# Patient Record
(demographics unavailable — no encounter records)

---

## 2024-12-14 NOTE — ASSESSMENT
[FreeTextEntry1] : 54 Y/O male with pmhx of htn, obesity, elevated psa presents for f/u.  # PSA elevation - lowered to 6.65 - Urology performed MRI, in 2023, interemdiate grade - needs f/u check PSA, refer back to Urology  # HTN -continues to remain elevated -c/w with losartan and added hydrochlorothiazide, discontinued amlodipine  # BMI - remains elevated at 42.52 kg/m2 - lifestyle modifications discussed  # Pre-DM -5.7% - f/u in next vis - metform  # Knee pain - start diclofenac PO  # HCM - colonoscopy refused - doing Stool testing last neg 2023 - routine labs ordered -f/u in 3 months.

## 2025-01-15 NOTE — HISTORY OF PRESENT ILLNESS
[FreeTextEntry1] : Pt is a 55y/o M here for follow up of lab results. PSA -- 6.65ng/mL UA/Cx - neg RBUS - no hydro, prostatomegaly with limited eval, rec prostate MRI  No new complaints today.  Labs  PSA 6.91 3/2022  PSA 6.65 6/2023 PSA 11.1 12/2024  MRI Prostate 8/2023 - 75cc prostate, PIRADS 3 - intermediate  Office Visit 01/15/2025  Pt reports no new complaints. He never followed

## 2025-01-15 NOTE — ASSESSMENT
[FreeTextEntry1] : #Elevated PSA - Prostate MRI 8/2023 shows a 75cc prostate with a PIRADS 3 lesion - MRI prostate 3D segmentation - PSA 11.1 (12/2024) - repeat PSA and MRI since it is over 1 year old - Plan for MRI guided transperineal Prostate Biopsy with Dr. Adrian Today I discussed the risks and benefits of PSA screening.T here are a number of benign conditions including UTI, BPH, mckinney catheter, certain activities and prostatitis that will increase PSA in the absence of cancer. Unfortunately, the only way to definitively diagnose cancer is with a prostate biopsy. I discussed the method of performing biopsy (transperineal with anesthesia) and the risks (bleeding, infection, urinary retention, ED). In addition to this, the patient and I discussed the discrepancy between the prevalence of prostate cancer and the prevalence of death from prostate cancer.  Prostate cancer is the most common solid tumor in American men. However, I mentioned how it can be very slow growing, many men with prostate cancer will die with the disease rather than from it.

## 2025-02-26 NOTE — HISTORY OF PRESENT ILLNESS
[FreeTextEntry1] : Prior pt of Dr. Nettles. Pt is a 53y/o M here for follow up of lab results. PSA -- 6.65ng/mL UA/Cx - neg RBUS - no hydro, prostatomegaly with limited eval, rec prostate MRI  Labs PSA 6.91 3/2022 PSA 6.65 6/2023 PSA 11.1 12/2024 PSA 9.7 1/2025  MRI Prostate 8/2023 - 75cc prostate, PIRADS 3 - intermediate  Office Visit 01/15/2025 Pt reports no new complaints. He never followed  Office Visit 02/26/2025  Pt reports no new complaints. We spent time today reviewing his labs and imaging.  Prostate MRI 2/2025 - PIRADS 4 in addition to known PIRADS 3 lesion. Prostate volume 62

## 2025-02-26 NOTE — PLAN

## 2025-02-26 NOTE — ASSESSMENT
[FreeTextEntry1] : #Elevated PSA - Prostate MRI 8/2023 shows a 75cc prostate with a PIRADS 3 lesion - Prostate MRI 2/2025 - PIRADS 4 in addition to known PIRADS 3 lesion. Prostate volume 62 - MRI prostate 3D segmentation - PSA 11.1 (12/2024) - PSA 9.73 2/2025 - Plan for MRI guided transperineal Prostate Biopsy with Dr. Adrian/Randal Today I discussed the risks and benefits of PSA screening.T here are a number of benign conditions including UTI, BPH, mckinney catheter, certain activities and prostatitis that will increase PSA in the absence of cancer. Unfortunately, the only way to definitively diagnose cancer is with a prostate biopsy. I discussed the method of performing biopsy (transperineal with anesthesia) and the risks (bleeding, infection, urinary retention, ED). In addition to this, the patient and I discussed the discrepancy between the prevalence of prostate cancer and the prevalence of death from prostate cancer. Prostate cancer is the most common solid tumor in American men. However, I mentioned how it can be very slow growing, many men with prostate cancer will die with the disease rather than from it.

## 2025-04-09 NOTE — ASSESSMENT
[FreeTextEntry1] : #Prostate Cancer - Favorable intermediate risk. GG2 in 1 core, 55% - Prostate MRI 8/2023 shows a 75cc prostate with a PIRADS 3 lesion initially evaluated with Dr. Nettles.  - Prostate MRI 2/2025 - PIRADS 4 in addition to known PIRADS 3 lesion. Prostate volume 62 - PSA 11.1 (12/2024) - PSA 9.73 2/2025 - PSMA to r/o mets - Will refer patient to our [facility] Urologic specialist for consultation regarding his treatment options including RALP vs RT/ADT

## 2025-04-09 NOTE — HISTORY OF PRESENT ILLNESS
[FreeTextEntry1] : Prior pt of Dr. Nettles. Pt is a 55y/o M hx of obesity, HTN, preDM here for follow up of lab results. PSA - 6.65ng/mL UA/Cx - neg RBUS - no hydro, prostatomegaly with limited eval, rec prostate MRI  Labs PSA 6.91 3/2022 PSA 6.65 6/2023 PSA 11.1 12/2024 PSA 9.7 1/2025  MRI Prostate 8/2023 - 75cc prostate, PIRADS 3 - intermediate  Office Visit 01/15/2025 Pt reports no new complaints. He never followed up previously.   Office Visit 02/26/2025 We spent time today reviewing his labs and imaging. Prostate MRI 2/2025 - PIRADS 4 in addition to known PIRADS 3 lesion. Prostate volume 62  Office Visit 04/09/2025  Pt reports no new complaints. Voiding well. Still having some mild hematospermia.  Prostate path: Isiah score 3+4=7 in 1 core involving 55% of specimen

## 2025-04-09 NOTE — PLAN

## 2025-05-15 NOTE — ASSESSMENT
[FreeTextEntry1] : DANGELO GARCÍA is a 56-year-old male with hx of elevated PSA, MR Prostate shows PI-RADS 4 and 3 lesion, sp TP fusion targeted prostate biopsy (03/31/2025) pathology shows 1/14 cores positive Isiah 3+4 PCa GG2, 55% of core positive with pattern 4 comprising 5% of core; both targets negative for prostate cancer   We had a long discussion today regarding options and my recommendation was for patient to undergo definitive treatment.  Patient was reluctant to undergo surgery even though I explained to him that there may be challenges for patients who undergo radiation and require salvage treatment.  He declined robotic radical prostatectomy and wishes to meet with radiation oncology.   Of note If he were to have radical prostatectomy, my recommendation was for patient to consider single port where the surgery can be done with minimal to no Trendelenburg in the supine position. With a BMI of 44, he will be a poor candidate for multiport transperitoneal robotic radical prostatectomy which is what we could offer the patient here in [place].  Pt elects to undergo radiation therapy. Will further discuss risk and benefits of treatment with radiation oncology.  He was previously seen in urology clinic and will continue f/u there

## 2025-05-15 NOTE — ADDENDUM
[FreeTextEntry1] : Patient's note was transcribed with the assistance of a medical scribe under the supervision of Dr. Pham. I, Dr. Pham, have reviewed the patient's chart and agree that it aligns with my medical decisions. Haile Celaya, our scribe, also served as a chaperone for physical examination purposes.  The submitted E/M billing level for this visit reflects the total time spent on the day of the visit including face-to-face time spent with the patient, non-face-to-face review of medical records and relevant information, documentation, and asynchronous communication with the patient after a visit via phone, email, or patients EHR portal after the visit.  The medical records reviewed are either scanned into the chart or reviewed with the patient using a patients electronic medical records portal for patients with records not available to [facility] via electronic transmission platforms from other institutions and labs.  Time spend counseling and performing coordination of care was also included in determining the appropriate EM billing level.

## 2025-05-15 NOTE — HISTORY OF PRESENT ILLNESS
[FreeTextEntry1] : DANGELO GARCÍA is a 56-year-old male with hx of elevated PSA, MR Prostate shows PI-RADS 4 and 3 lesion, sp TP fusion targeted prostate biopsy (03/31/2025) pathology shows 1/14 cores positive Isiah 3+4 PCa GG2, 55% of core positive with pattern 4 comprising 5% of core; both targets negative for prostate cancer   Denies flank pain, gross hematuria, dysuria or associated symptoms. Denies LUTS.  MR Prostate w/wo IV Cont 02/17/2025 - images independently reviewed by me agree w findings IMPRESSION: PIRADS 4 - High (clinically significant cancer is likely to be present). Left, posterior medial (PZpm), midgland, peripheral zone lesion. Redemonstration of PIRADS 3 lesion - Intermediate (the presence of clinically significant cancer is equivocal). Left, posterior (TZp), apex, transition zone. Prostate Volume: 62 cc  PSMA PET CT 04/29/2025 - IMPRESSION: Two foci of PSMA uptake within the prostate gland (up to 2+). Nonspecific pelvic and inguinal subcentimeter lymph nodes with 1+ PSMA uptake. No other definite sites of abnormal PSMA uptake to suggest metastatic disease  PSA Trend 03/2022 - 6.91  06/2023 - 6.65  12/2024 - 11.1  01/2025 - 9.7    history: Denies previous kidney stones, recurrent UTIs. No instrumentation. Family History: denies  malignancies. Social History: from [facility], , two kids

## 2025-06-04 NOTE — ASSESSMENT
[FreeTextEntry1] : 56 Y/O male with pmhx of htn, obesity, elevated psa presents for f/u. Poor follow up  # PSA elevation - found to have prostate cancer, [place] score 3+4 - Favorable intermediate risk. GG2 in 1 core, 55%  Labs PSA 6.91 3/2022 PSA 6.65 6/2023 PSA 11.1 12/2024 PSA  9.73 1/16/25  - Urology note reviewed - awaiting for radiation oncology.   # HTN (142/93) -continues to remain elevated -c/w with losartan and added hydrochlorothiazide, discontinued amlodipine  # BMI - remains elevated at 44.01 kg/m2 - lifestyle modifications discussed - consider Ozempic if insurance covers Rx  # Pre-DM - 6.4% (June 2024) - on metform 1000 mg Q24 XL for DM prevention. - repeat labs  # Knee pain - stable - topical Diclofenac 1%  PRN  # HCM - colonoscopy refused prior.  will revisit this once treatment for prostate cancer is started - routine labs ordered -f/u in 2 months.

## 2025-06-04 NOTE — HISTORY OF PRESENT ILLNESS
[de-identified] :  56-year-old male with HTN, and hx of elevated PSA,  sp TP fusion targeted prostate biopsy (03/31/2025) with Isiah 3+4 prostate cancer. In for medical follow up. Patient awaiting evaluation for Radiation Oncology.  currently, feels well no complaints.

## 2025-06-04 NOTE — ASSESSMENT
[FreeTextEntry1] : 56 Y/O male with pmhx of htn, obesity, elevated psa presents for f/u. Poor follow up  # PSA elevation - found to have prostate cancer, [place] score 3+4 - Favorable intermediate risk. GG2 in 1 core, 55%  Labs PSA 6.91 3/2022 PSA 6.65 6/2023 PSA 11.1 12/2024 PSA  9.73 1/16/25  - Urology note reviewed - awaiting for radiation oncology.   # HTN (142/93) -continues to remain elevated -c/w with losartan and added hydrochlorothiazide, discontinued amlodipine  # BMI - remains elevated at 44.01 kg/m2 - lifestyle modifications discussed - consider Ozempic if insurance covers Rx  # Pre-DM - 6.4% (June 2024) - on metform 1000 mg Q24 XL for DM prevention. - repeat labs  # Knee pain - stable - topical Diclofenac 1%  PRN  # HCM - colonoscopy refused prior.  will revisit this once treatment for prostate cancer is started - routine labs ordered -f/u in 2 months.

## 2025-06-04 NOTE — HISTORY OF PRESENT ILLNESS
[de-identified] :  56-year-old male with HTN, and hx of elevated PSA,  sp TP fusion targeted prostate biopsy (03/31/2025) with Isiah 3+4 prostate cancer. In for medical follow up. Patient awaiting evaluation for Radiation Oncology.  currently, feels well no complaints.

## 2025-06-18 NOTE — PHYSICAL EXAM
[General Appearance - Well Developed] : well developed [General Appearance - Alert] : alert [General Appearance - In No Acute Distress] : in no acute distress [Sclera] : the sclera and conjunctiva were normal [Examination Of The Oral Cavity] : the lips and gums were normal [] : no respiratory distress [Heart Rate And Rhythm] : heart rate and rhythm were normal [Musculoskeletal - Swelling] : no joint swelling [Skin Color & Pigmentation] : normal skin color and pigmentation [No Focal Deficits] : no focal deficits [Oriented To Time, Place, And Person] : oriented to person, place, and time

## 2025-06-20 NOTE — DISEASE MANAGEMENT
[10-20] : 10 - 20 ng/mL [Biopsy with Fusion] : Patient had a biopsy with fusion on [7(3+4)] : Fusion Biopsy [place] Score: 7(3+4) [4] : 4 [1] : T1 [c] : c [0] : M0 [IIB] : IIB [] : Patient had no Bone Scan performed [BiopsyDate] : 3/25 [MeasuredProstateVolume] : 62 [TotalCores] : 14 [TotalPositiveCores] : 1 [MaxCoreInvolvement] : 55

## 2025-06-20 NOTE — END OF VISIT
[FreeTextEntry3] : MD Note: I personally performed the evaluation and management services for this patient. This includes conducting the examination, assessing all conditions, and establishing the plan of care. Today, my ACP, Sujey Vela, was here to observe my evaluation and management services for this patient. I agree with the documentation above, which I have reviewed and edited where appropriate.  [Time Spent: ___ minutes] : I have spent [unfilled] minutes of time on the encounter which excludes teaching and separately reported services.

## 2025-06-20 NOTE — REVIEW OF SYSTEMS
[Nocturia] : nocturia [IPSS Score (0-40): ___] : IPSS score: [unfilled] [Joint Pain] : joint pain [Negative] : Allergic/Immunologic [EPIC-CP Score (0-60): ___] : EPIC-CP score: [unfilled] [Urinary Ostomy] : no ~T urinary ostomy present [Urinary Frequency] : no urinary frequency [FreeTextEntry3] : reading glasses [FreeTextEntry7] : never had colonoscopy/ does colorguard [FreeTextEntry8] : one nocturia [FreeTextEntry9] : knee pain

## 2025-06-20 NOTE — HISTORY OF PRESENT ILLNESS
[FreeTextEntry1] : DANGELO GARCÍA is 56 year old man presenting today for initial consultation for prostate cancer. He has h/o HTN and has BMI of 44. Patient has had elevated PSA since 2023. His most recent PSA was 9.73ng/ml in January/2025 and it was 11.10ng/ml in December/2024. His most recent MRI prostate in Feb/2025 showed a 62cc prostate with a PIRADS 3 lesion in Left, posterior (TZp), apex, transition zone and PIRADS 4 lesion in Left, posterior medial (PZpm), midgland, peripheral zone lesion. His prostate biopsy was done on 3/31/2025 and it showed one out of 14 cores with Prostate cancer. It was a [place] 7(3+4)Pca. Patient denies any bothersome urinary symptoms. He has one episode of nocturia and IPSS score is 1. His EPIC is 0.   PSA level: 1/15/2025: PSA 9.73ng/ml 12/14/2024: PSA 11.10ng/ml 6/22/2023: PSA 6.65ng/ml  MRI prostate:8/12/2023: IMPRESSION: Prostate lesion as detailed above. PIRADS 3 - Intermediate (the presence of clinically significant cancer is equivocal)   MRI prostate 2/17/2025: IMPRESSION: *  PIRADS 4 - High (clinically significant cancer is likely to be present). Left, posterior medial (PZpm), midgland, peripheral zone lesion. *  Redemonstration of PIRADS 3 lesion - Intermediate (the presence of clinically significant cancer is equivocal). Left, posterior (TZp), apex, transition zone   Prostate Biopsy 3/31/2025: Final Diagnosis 1. Right medial apex prostate, needle biopsy: -Benign prostatic tissue.  2. Right medial base prosate, needle biopsy: -Benign prostatic tissue.  3. Right lateral apex prosaten,needle biopsy: -Benign prostatic tissue. Mild chronic inflammation.  4. Right lateral base prosate, needle biopsy: -Benign prostatic tissue. Mild chronic inflammation.  5. Right lateral prostate, needle biopsy: -Benign prostatic tissue. Moderate atrophy. Mild chronic inflammation.  6. Right anterior prostate, needle biopsy: -Benign prostatic tissue. Mild chronic inflammation.  7. Left medial apex prostate, needle biopsy: -Benign prostatic tissue. Moderate atrophy. Mild chronic inflammation.  8. Left medial base prostate, needle biopsy: -Benign prostatic tissue.  9. Left lateral apex prostate, needle biopsy: -Benign prostatic tissue.  10. Left lateral base prostate, needle biopsy: -Benign prostatic tissue. Moderate atrophy.  11. Left lateral prosate, needle biopsy: -Benign prostatic tissue. Moderate atrophy. Mild chronic inflammation.  12. Left anterior prostate, needle biopsy: -Adenocarcinoma of the prostate. Isiah score 3+4=7, ( Grade Group 2) involving 55% of the specimen and 1 of 1 core(s) . [place] pattern 4 comprises 5% of the cancer.  13. ALESSIO: Left midgland prostate, needle biopsy: -Benign prostatic tissue. Mild chronic inflammation.  14.ALESSIO: Left anterior apex prostate, needle biopsy: -Benign prostatic tissue. Moderate atrophy. Mild chronic inflammation.   PSMA 4/29/2025: IMPRESSION: Two foci of PSMA uptake within the prostate gland (up to 2+). Nonspecific pelvic and inguinal subcentimeter lymph nodes with 1+ PSMA uptake. No other definite sites of abnormal PSMA uptake to suggest metastatic disease

## 2025-06-20 NOTE — HISTORY OF PRESENT ILLNESS
[FreeTextEntry1] : DANGELO GARCÍA is 56 year old man presenting today for initial consultation for prostate cancer. He has h/o HTN and has BMI of 44. Patient has had elevated PSA since 2023. His most recent PSA was 9.73ng/ml in January/2025 and it was 11.10ng/ml in December/2024. His most recent MRI prostate in Feb/2025 showed a 62cc prostate with a PIRADS 3 lesion in Left, posterior (TZp), apex, transition zone and PIRADS 4 lesion in Left, posterior medial (PZpm), midgland, peripheral zone lesion. His prostate biopsy was done on 3/31/2025 and it showed one out of 14 cores with Prostate cancer. It was a [place] 7(3+4)Pca. Patient denies any bothersome urinary symptoms. He has one episode of nocturia and IPSS score is 1. His EPIC is 0.   PSA level: 1/15/2025: PSA 9.73ng/ml 12/14/2024: PSA 11.10ng/ml 6/22/2023: PSA 6.65ng/ml  MRI prostate:8/12/2023: IMPRESSION: Prostate lesion as detailed above. PIRADS 3 - Intermediate (the presence of clinically significant cancer is equivocal)   MRI prostate 2/17/2025: IMPRESSION: *  PIRADS 4 - High (clinically significant cancer is likely to be present). Left, posterior medial (PZpm), midgland, peripheral zone lesion. *  Redemonstration of PIRADS 3 lesion - Intermediate (the presence of clinically significant cancer is equivocal). Left, posterior (TZp), apex, transition zone   Prostate Biopsy 3/31/2025: Final Diagnosis 1. Right medial apex prostate, needle biopsy: -Benign prostatic tissue.  2. Right medial base prosate, needle biopsy: -Benign prostatic tissue.  3. Right lateral apex prosaten,needle biopsy: -Benign prostatic tissue. Mild chronic inflammation.  4. Right lateral base prosate, needle biopsy: -Benign prostatic tissue. Mild chronic inflammation.  5. Right lateral prostate, needle biopsy: -Benign prostatic tissue. Moderate atrophy. Mild chronic inflammation.  6. Right anterior prostate, needle biopsy: -Benign prostatic tissue. Mild chronic inflammation.  7. Left medial apex prostate, needle biopsy: -Benign prostatic tissue. Moderate atrophy. Mild chronic inflammation.  8. Left medial base prostate, needle biopsy: -Benign prostatic tissue.  9. Left lateral apex prostate, needle biopsy: -Benign prostatic tissue.  10. Left lateral base prostate, needle biopsy: -Benign prostatic tissue. Moderate atrophy.  11. Left lateral prosate, needle biopsy: -Benign prostatic tissue. Moderate atrophy. Mild chronic inflammation.  12. Left anterior prostate, needle biopsy: -Adenocarcinoma of the prostate. Isiah score 3+4=7, ( Grade Group 2) involving 55% of the specimen and 1 of 1 core(s) . [place] pattern 4 comprises 5% of the cancer.  13. ALESSIO: Left midgland prostate, needle biopsy: -Benign prostatic tissue. Mild chronic inflammation.  14.ALESSIO: Left anterior apex prostate, needle biopsy: -Benign prostatic tissue. Moderate atrophy. Mild chronic inflammation.   PSMA 4/29/2025: IMPRESSION: Two foci of PSMA uptake within the prostate gland (up to 2+). Nonspecific pelvic and inguinal subcentimeter lymph nodes with 1+ PSMA uptake. No other definite sites of abnormal PSMA uptake to suggest metastatic disease

## 2025-06-20 NOTE — HISTORY OF PRESENT ILLNESS
[FreeTextEntry1] : DANGELO GARCÍA is 56 year old man presenting today for initial consultation for prostate cancer. He has h/o HTN and has BMI of 44. Patient has had elevated PSA since 2023. His most recent PSA was 9.73ng/ml in January/2025 and it was 11.10ng/ml in December/2024. His most recent MRI prostate in Feb/2025 showed a 62cc prostate with a PIRADS 3 lesion in Left, posterior (TZp), apex, transition zone and PIRADS 4 lesion in Left, posterior medial (PZpm), midgland, peripheral zone lesion. His prostate biopsy was done on 3/31/2025 and it showed one out of 14 cores with Prostate cancer. It was a [place] 7(3+4)Pca. Patient denies any bothersome urinary symptoms. He has one episode of nocturia and IPSS score is 1. His EPIC is 0.   PSA level: 1/15/2025: PSA 9.73ng/ml 12/14/2024: PSA 11.10ng/ml 6/22/2023: PSA 6.65ng/ml  MRI prostate:8/12/2023: IMPRESSION: Prostate lesion as detailed above. PIRADS 3 - Intermediate (the presence of clinically significant cancer is equivocal)   MRI prostate 2/17/2025: IMPRESSION: *  PIRADS 4 - High (clinically significant cancer is likely to be present). Left, posterior medial (PZpm), midgland, peripheral zone lesion. *  Redemonstration of PIRADS 3 lesion - Intermediate (the presence of clinically significant cancer is equivocal). Left, posterior (TZp), apex, transition zone   Prostate Biopsy 3/31/2025: Final Diagnosis 1. Right medial apex prostate, needle biopsy: -Benign prostatic tissue.  2. Right medial base prosate, needle biopsy: -Benign prostatic tissue.  3. Right lateral apex prosaten,needle biopsy: -Benign prostatic tissue. Mild chronic inflammation.  4. Right lateral base prosate, needle biopsy: -Benign prostatic tissue. Mild chronic inflammation.  5. Right lateral prostate, needle biopsy: -Benign prostatic tissue. Moderate atrophy. Mild chronic inflammation.  6. Right anterior prostate, needle biopsy: -Benign prostatic tissue. Mild chronic inflammation.  7. Left medial apex prostate, needle biopsy: -Benign prostatic tissue. Moderate atrophy. Mild chronic inflammation.  8. Left medial base prostate, needle biopsy: -Benign prostatic tissue.  9. Left lateral apex prostate, needle biopsy: -Benign prostatic tissue.  10. Left lateral base prostate, needle biopsy: -Benign prostatic tissue. Moderate atrophy.  11. Left lateral prosate, needle biopsy: -Benign prostatic tissue. Moderate atrophy. Mild chronic inflammation.  12. Left anterior prostate, needle biopsy: -Adenocarcinoma of the prostate. Isiah score 3+4=7, ( Grade Group 2) involving 55% of the specimen and 1 of 1 core(s) . [place] pattern 4 comprises 5% of the cancer.  13. ALESSIO: Left midgland prostate, needle biopsy: -Benign prostatic tissue. Mild chronic inflammation.  14.ALESSIO: Left anterior apex prostate, needle biopsy: -Benign prostatic tissue. Moderate atrophy. Mild chronic inflammation.   PSMA 4/29/2025: IMPRESSION: Two foci of PSMA uptake within the prostate gland (up to 2+). Nonspecific pelvic and inguinal subcentimeter lymph nodes with 1+ PSMA uptake. No other definite sites of abnormal PSMA uptake to suggest metastatic disease

## 2025-06-20 NOTE — DISEASE MANAGEMENT
[10-20] : 10 - 20 ng/mL [Biopsy with Fusion] : Patient had a biopsy with fusion on [7(3+4)] : Fusion Biopsy [place] Score: 7(3+4) [4] : 4 [1] : T1 [c] : c [0] : M0 [IIB] : IIB [] : Patient had no Bone Scan performed [BiopsyDate] : 3/25 [MeasuredProstateVolume] : 62 [TotalCores] : 14 [TotalPositiveCores] : 1 [MaxCoreInvolvement] : 55

## 2025-06-20 NOTE — DISEASE MANAGEMENT
[10-20] : 10 - 20 ng/mL [Biopsy with Fusion] : Patient had a biopsy with fusion on [7(3+4)] : Fusion Biopsy [place] Score: 7(3+4) [4] : 4 [1] : T1 [c] : c [0] : M0 [IIB] : IIB [] : Patient had no Bone Scan performed [BiopsyDate] : 3/25 [MeasuredProstateVolume] : 62 [TotalCores] : 14 [TotalPositiveCores] : 1 [MaxCoreInvolvement] : 55